# Patient Record
Sex: FEMALE | Race: WHITE | NOT HISPANIC OR LATINO | Employment: PART TIME | URBAN - NONMETROPOLITAN AREA
[De-identification: names, ages, dates, MRNs, and addresses within clinical notes are randomized per-mention and may not be internally consistent; named-entity substitution may affect disease eponyms.]

---

## 2020-09-21 ENCOUNTER — OFFICE VISIT (OUTPATIENT)
Dept: PULMONOLOGY | Facility: CLINIC | Age: 55
End: 2020-09-21

## 2020-09-21 VITALS
TEMPERATURE: 97.7 F | HEIGHT: 68 IN | BODY MASS INDEX: 28.04 KG/M2 | WEIGHT: 185 LBS | DIASTOLIC BLOOD PRESSURE: 76 MMHG | HEART RATE: 100 BPM | SYSTOLIC BLOOD PRESSURE: 118 MMHG | OXYGEN SATURATION: 77 %

## 2020-09-21 DIAGNOSIS — F17.210 CIGARETTE NICOTINE DEPENDENCE WITHOUT COMPLICATION: ICD-10-CM

## 2020-09-21 DIAGNOSIS — J44.9 COPD WITH HYPOXIA (HCC): Primary | ICD-10-CM

## 2020-09-21 DIAGNOSIS — R09.02 COPD WITH HYPOXIA (HCC): Primary | ICD-10-CM

## 2020-09-21 PROCEDURE — 94664 DEMO&/EVAL PT USE INHALER: CPT | Performed by: NURSE PRACTITIONER

## 2020-09-21 PROCEDURE — 99203 OFFICE O/P NEW LOW 30 MIN: CPT | Performed by: NURSE PRACTITIONER

## 2020-09-21 RX ORDER — ALBUTEROL SULFATE 90 UG/1
AEROSOL, METERED RESPIRATORY (INHALATION)
COMMUNITY
Start: 2020-07-09 | End: 2020-12-23

## 2020-09-21 RX ORDER — HYDROCODONE BITARTRATE AND ACETAMINOPHEN 10; 325 MG/1; MG/1
TABLET ORAL
COMMUNITY
Start: 2020-07-09

## 2020-09-21 RX ORDER — PREDNISONE 20 MG/1
40 TABLET ORAL DAILY
Qty: 10 TABLET | Refills: 0 | Status: SHIPPED | OUTPATIENT
Start: 2020-09-21 | End: 2021-12-15

## 2020-09-21 RX ORDER — IPRATROPIUM BROMIDE AND ALBUTEROL SULFATE 2.5; .5 MG/3ML; MG/3ML
3 SOLUTION RESPIRATORY (INHALATION) EVERY 12 HOURS SCHEDULED
COMMUNITY
End: 2020-12-23 | Stop reason: SDUPTHER

## 2020-09-21 NOTE — PROGRESS NOTES
Were you born premature?  no    Any Childhood infections? no      Breathing problems when you were a child? no    Any childhood allergies?    no             At what age did you begin smoking? 14 years old    Smoking marijuana? yes    Any IV drugs? no    How many packs per day? 1 1/2    Lung Function Test? no  Chest X-Ray? yes    CT Chest? yes Allergy Test? no    Family hx of Lung disease or Lung Cancer?yes    If FHx is posivitive for lung cancer, what is the relationship of the family member? sister(s)    Any hospitalization in the last year? no    How far can you walk without getting short of breath? Patient cant even walk around the house without being short of breath    Any coughing? yes    Any wheezing? yes    Acid Reflux? no    Do you snore? yes    Daytime Fatigue? yes    Any pets? yes (Dog and Cat)  Any pet allergies? 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 no    What inhalers are you currently using? Albuterol PRN      Subjective    Karissa Jay presents for the following Breathing Problem and Shortness of Breath  .    History of Present Illness     Ms. Jay is a 54 year old female with no significant medical history.     She presents today for further evaluation of shortness of breath.  She reports that she started to experience shortness of breath about 2 years ago.  She reports that is has gradually worsened.  She reports that she also experiences lightheadedness and dizziness with exertion.  She also reports feeling more fatigued over the last few months.  She does have a cough that is occasionally productive.  She is currently only using an albuterol inhaler and does use supplemental oxygen at 2 L/min during the night.  She currently smokes about 1-1/2 packs/day for the last 40 years.      Review of Systems   Constitutional: Positive for fatigue.   HENT: Positive for congestion.    Respiratory: Positive for cough, chest  "tightness, shortness of breath and wheezing.    Cardiovascular: Positive for leg swelling.   Allergic/Immunologic: Positive for environmental allergies.   All other systems reviewed and are negative.      Active Problems:  Problem List Items Addressed This Visit     None      Visit Diagnoses     COPD with hypoxia (CMS/[facility])    -  Primary    Relevant Medications    albuterol sulfate  (90 Base) MCG/ACT inhaler    ipratropium-albuterol (DUO-NEB) 0.5-2.5 mg/3 ml nebulizer    predniSONE (DELTASONE) 20 MG tablet    Cigarette nicotine dependence without complication              Past Medical History:  No past medical history on file.    Family History:  Family History   Problem Relation Age of Onset   • Diabetes Mother    • COPD Father    • Cancer Maternal Aunt    • Cancer Maternal Uncle    • Cancer Paternal Aunt    • Cancer Paternal Uncle        Social History:  Social History     Tobacco Use   • Smoking status: Heavy Tobacco Smoker     Packs/day: 1.50     Years: 41.00     Pack years: 61.50     Types: Cigarettes     Start date: 1/1/1979   • Smokeless tobacco: Never Used   • Tobacco comment: Patient doesnt feel like she can stop smoking on her own   Substance Use Topics   • Alcohol use: Never     Frequency: Never       Current Medications:  Current Outpatient Medications   Medication Sig Dispense Refill   • albuterol sulfate  (90 Base) MCG/ACT inhaler      • HYDROcodone-acetaminophen (NORCO)  MG per tablet      • ipratropium-albuterol (DUO-NEB) 0.5-2.5 mg/3 ml nebulizer 3 mL Every 12 (Twelve) Hours.     • predniSONE (DELTASONE) 20 MG tablet Take 2 tablets by mouth Daily. 10 tablet 0     No current facility-administered medications for this visit.        Allergies:  No Known Allergies    Vitals:  /76 (BP Location: Right arm, Patient Position: Sitting, Cuff Size: Adult)   Pulse 100   Temp 97.7 °F (36.5 °C) (Infrared)   Ht 172.7 cm (68\")   Wt 83.9 kg (185 lb)   SpO2 (!) 77%   BMI 28.13 kg/m² "     Imaging:    Imaging Results (Most Recent)     None          Pulmonary Functions Testing Results:    No results found for: FEV1, FVC, UZE2IXR, TLC, DLCO    No results found for this or any previous visit.    Objective   Physical Exam     GENERAL APPEARANCE: Well developed, well nourished, alert and cooperative, and appears to be in no acute distress.    HEAD: normocephalic. Atraumatic.    EYES: PERRL, EOMI. Vision is grossly intact.    THROAT: Oral cavity and pharynx normal. No inflammation, swelling, exudate, or lesions.     NECK: Neck supple.  No thyromegaly.    CARDIAC: Normal S1 and S2. No S3, S4 or murmurs. Rhythm is regular.     RESPIRATORY:Bilateral air entry positive. Bilateral diminished breath sounds. No wheezing, crackles or rhonchi noted.    GI: Positive bowel sounds. Soft, nondistended, nontender.     MUSCULOSKELETAL: No significant deformity or joint abnormality. No edema. Peripheral pulses intact. No varicosities.    NEUROLOGICAL: Strength and sensation symmetric and intact throughout.     PSYCHIATRIC: The mental examination revealed the patient was oriented to person, place, and time.       Assessment/Plan      COPD, unspecified:  -Ordered PFT to assess lung function  -Ordered chest xray  -Ordered Echo to evaluate LV function.  -Oxygen saturation noted to be 77% on room air at rest.  Will send order to the patient's [employer] company requesting a POC as well as other oxygen supplies.  Instructed her to wear her oxygen continuously.  -We will do overnight pulse oximetry study to evaluate oxygen saturation on her 2 L/min at nighttime.  -Will prescribe Prednisone 40 mg PO daily for 5 day for wheezing.  -Will start her on Stiolto, 2 puffs once daily.  Inhaler education was provided.     - Inhaler technique demonstration/discussion:  I have extensively discussed the steps.  In summary, the steps were discussed in the following order.Patient was advised to rinse the mouth after steroid inhalation to prevent  fungal mucositis.  · Remove the cap from the inhaler and shake well.    · Breathe out all the way.    · Place the mouthpiece of the inhaler between your teeth and seal your lips tightly around it.    · As you start to breathe in slowly, press down on the canister one time.   · Keep breathing in as slowly and deeply as you can.    · It should take about 5 seconds for you to completely breathe in.    · Hold your breath for 10 seconds(count to 10 slowly) to allow the medication to reach the airways of the lung.    · Repeat the above steps for each puff.    · Wait about 1 minute between the puffs.    · Replaced the cap on the inhaler when finished.    Patient's Body mass index is 28.13 kg/m². BMI is above normal parameters. Recommendations include: exercise counseling and nutrition counseling.    Current Smoker:    Karissa Jay  reports that she has been smoking cigarettes. She started smoking about 41 years ago. She has a 61.50 pack-year smoking history. She has never used smokeless tobacco.. I have educated her on the risk of diseases from using tobacco products such as cancer, COPD and heart diease.     I advised her to quit and she is not willing to quit.            ICD-10-CM ICD-9-CM   1. COPD with hypoxia (CMS/[facility])  J44.9 496    R09.02 799.02   2. Cigarette nicotine dependence without complication  F17.210 305.1       Return in about 3 months (around 12/21/2020).

## 2020-10-07 ENCOUNTER — TRANSCRIBE ORDERS (OUTPATIENT)
Dept: ADMINISTRATIVE | Facility: HOSPITAL | Age: 55
End: 2020-10-07

## 2020-10-07 DIAGNOSIS — Z11.59 ENCOUNTER FOR SCREENING FOR OTHER VIRAL DISEASES: Primary | ICD-10-CM

## 2020-10-09 ENCOUNTER — TELEPHONE (OUTPATIENT)
Dept: PULMONOLOGY | Facility: CLINIC | Age: 55
End: 2020-10-09

## 2020-10-09 ENCOUNTER — LAB (OUTPATIENT)
Dept: LAB | Facility: HOSPITAL | Age: 55
End: 2020-10-09

## 2020-10-09 DIAGNOSIS — Z11.59 ENCOUNTER FOR SCREENING FOR OTHER VIRAL DISEASES: ICD-10-CM

## 2020-10-09 PROCEDURE — U0004 COV-19 TEST NON-CDC HGH THRU: HCPCS

## 2020-10-09 PROCEDURE — C9803 HOPD COVID-19 SPEC COLLECT: HCPCS

## 2020-10-10 LAB — SARS-COV-2 RNA RESP QL NAA+PROBE: NOT DETECTED

## 2020-10-12 ENCOUNTER — HOSPITAL ENCOUNTER (OUTPATIENT)
Dept: GENERAL RADIOLOGY | Facility: HOSPITAL | Age: 55
Discharge: HOME | End: 2020-10-12

## 2020-10-12 ENCOUNTER — HOSPITAL ENCOUNTER (OUTPATIENT)
Dept: CARDIOLOGY | Facility: HOSPITAL | Age: 55
Discharge: HOME | End: 2020-10-12

## 2020-10-12 ENCOUNTER — HOSPITAL ENCOUNTER (OUTPATIENT)
Dept: RESPIRATORY THERAPY | Facility: HOSPITAL | Age: 55
Discharge: HOME | End: 2020-10-12

## 2020-10-12 DIAGNOSIS — R09.02 COPD WITH HYPOXIA (HCC): ICD-10-CM

## 2020-10-12 DIAGNOSIS — J44.9 COPD WITH HYPOXIA (HCC): ICD-10-CM

## 2020-10-12 LAB
BH CV ECHO MEAS - % IVS THICK: 11.6 %
BH CV ECHO MEAS - % LVPW THICK: 41.6 %
BH CV ECHO MEAS - ACS: 2.1 CM
BH CV ECHO MEAS - AO MAX PG: 6.1 MMHG
BH CV ECHO MEAS - AO MEAN PG: 3 MMHG
BH CV ECHO MEAS - AO ROOT AREA (BSA CORRECTED): 1.7
BH CV ECHO MEAS - AO ROOT AREA: 9.3 CM^2
BH CV ECHO MEAS - AO ROOT DIAM: 3.5 CM
BH CV ECHO MEAS - AO V2 MAX: 123 CM/SEC
BH CV ECHO MEAS - AO V2 MEAN: 81.1 CM/SEC
BH CV ECHO MEAS - AO V2 VTI: 26.4 CM
BH CV ECHO MEAS - BSA(HAYCOCK): 2 M^2
BH CV ECHO MEAS - BSA: 2 M^2
BH CV ECHO MEAS - BZI_BMI: 28.1 KILOGRAMS/M^2
BH CV ECHO MEAS - BZI_METRIC_HEIGHT: 172.7 CM
BH CV ECHO MEAS - BZI_METRIC_WEIGHT: 83.9 KG
BH CV ECHO MEAS - EDV(CUBED): 136.6 ML
BH CV ECHO MEAS - EDV(MOD-SP4): 52.2 ML
BH CV ECHO MEAS - EDV(TEICH): 126.6 ML
BH CV ECHO MEAS - EF(CUBED): 61.9 %
BH CV ECHO MEAS - EF(MOD-SP4): 57.9 %
BH CV ECHO MEAS - EF(TEICH): 53.1 %
BH CV ECHO MEAS - ESV(CUBED): 52.1 ML
BH CV ECHO MEAS - ESV(MOD-SP4): 22 ML
BH CV ECHO MEAS - ESV(TEICH): 59.5 ML
BH CV ECHO MEAS - FS: 27.5 %
BH CV ECHO MEAS - IVS/LVPW: 1.1
BH CV ECHO MEAS - IVSD: 1 CM
BH CV ECHO MEAS - IVSS: 1.1 CM
BH CV ECHO MEAS - LA DIMENSION: 2.7 CM
BH CV ECHO MEAS - LA/AO: 0.78
BH CV ECHO MEAS - LV DIASTOLIC VOL/BSA (35-75): 26.4 ML/M^2
BH CV ECHO MEAS - LV MASS(C)D: 183.7 GRAMS
BH CV ECHO MEAS - LV MASS(C)DI: 92.9 GRAMS/M^2
BH CV ECHO MEAS - LV MASS(C)S: 154.1 GRAMS
BH CV ECHO MEAS - LV MASS(C)SI: 77.9 GRAMS/M^2
BH CV ECHO MEAS - LV SYSTOLIC VOL/BSA (12-30): 11.1 ML/M^2
BH CV ECHO MEAS - LVIDD: 5.2 CM
BH CV ECHO MEAS - LVIDS: 3.7 CM
BH CV ECHO MEAS - LVLD AP4: 6.9 CM
BH CV ECHO MEAS - LVLS AP4: 6.3 CM
BH CV ECHO MEAS - LVOT AREA (M): 3.1 CM^2
BH CV ECHO MEAS - LVOT AREA: 3.1 CM^2
BH CV ECHO MEAS - LVOT DIAM: 2 CM
BH CV ECHO MEAS - LVPWD: 0.95 CM
BH CV ECHO MEAS - LVPWS: 1.3 CM
BH CV ECHO MEAS - MV A MAX VEL: 55.7 CM/SEC
BH CV ECHO MEAS - MV E MAX VEL: 62.4 CM/SEC
BH CV ECHO MEAS - MV E/A: 1.1
BH CV ECHO MEAS - PA ACC TIME: 0.15 SEC
BH CV ECHO MEAS - PA PR(ACCEL): 12.4 MMHG
BH CV ECHO MEAS - RAP SYSTOLE: 10 MMHG
BH CV ECHO MEAS - RVSP: 31.5 MMHG
BH CV ECHO MEAS - SI(AO): 124.8 ML/M^2
BH CV ECHO MEAS - SI(CUBED): 42.7 ML/M^2
BH CV ECHO MEAS - SI(MOD-SP4): 15.3 ML/M^2
BH CV ECHO MEAS - SI(TEICH): 34 ML/M^2
BH CV ECHO MEAS - SV(AO): 246.8 ML
BH CV ECHO MEAS - SV(CUBED): 84.5 ML
BH CV ECHO MEAS - SV(MOD-SP4): 30.2 ML
BH CV ECHO MEAS - SV(TEICH): 67.2 ML
BH CV ECHO MEAS - TR MAX VEL: 232 CM/SEC
LV EF 2D ECHO EST: 60 %
MAXIMAL PREDICTED HEART RATE: 168 BPM
STRESS TARGET HR: 143 BPM

## 2020-10-12 PROCEDURE — 93306 TTE W/DOPPLER COMPLETE: CPT | Performed by: INTERNAL MEDICINE

## 2020-10-12 PROCEDURE — 94640 AIRWAY INHALATION TREATMENT: CPT

## 2020-10-12 PROCEDURE — 71046 X-RAY EXAM CHEST 2 VIEWS: CPT

## 2020-10-12 PROCEDURE — 93356 MYOCRD STRAIN IMG SPCKL TRCK: CPT | Performed by: INTERNAL MEDICINE

## 2020-10-12 PROCEDURE — 94729 DIFFUSING CAPACITY: CPT

## 2020-10-12 PROCEDURE — 94060 EVALUATION OF WHEEZING: CPT

## 2020-10-12 PROCEDURE — 71046 X-RAY EXAM CHEST 2 VIEWS: CPT | Performed by: RADIOLOGY

## 2020-10-12 PROCEDURE — 93356 MYOCRD STRAIN IMG SPCKL TRCK: CPT

## 2020-10-12 PROCEDURE — 94727 GAS DIL/WSHOT DETER LNG VOL: CPT

## 2020-10-12 PROCEDURE — 94729 DIFFUSING CAPACITY: CPT | Performed by: INTERNAL MEDICINE

## 2020-10-12 PROCEDURE — 93306 TTE W/DOPPLER COMPLETE: CPT

## 2020-10-12 PROCEDURE — 94060 EVALUATION OF WHEEZING: CPT | Performed by: INTERNAL MEDICINE

## 2020-10-12 PROCEDURE — 94727 GAS DIL/WSHOT DETER LNG VOL: CPT | Performed by: INTERNAL MEDICINE

## 2020-10-12 RX ORDER — ALBUTEROL SULFATE 2.5 MG/3ML
2.5 SOLUTION RESPIRATORY (INHALATION) ONCE
Status: COMPLETED | OUTPATIENT
Start: 2020-10-12 | End: 2020-10-12

## 2020-10-12 RX ADMIN — ALBUTEROL SULFATE 2.5 MG: 2.5 SOLUTION RESPIRATORY (INHALATION) at 11:17

## 2020-10-19 DIAGNOSIS — R93.89 ABNORMAL CHEST X-RAY: Primary | ICD-10-CM

## 2020-10-19 NOTE — PROGRESS NOTES
Echo and Chest xray were discussed with the patient in detail.  Chest xray was reviewed by Dr. Francis, he recommends a CT chest for further evaluation.

## 2020-10-20 ENCOUNTER — DOCUMENTATION (OUTPATIENT)
Dept: PULMONOLOGY | Facility: CLINIC | Age: 55
End: 2020-10-20

## 2020-10-22 ENCOUNTER — TELEPHONE (OUTPATIENT)
Dept: PULMONOLOGY | Facility: CLINIC | Age: 55
End: 2020-10-22

## 2020-10-22 NOTE — TELEPHONE ENCOUNTER
----- Message from BIB Alcazar sent at 10/20/2020  4:13 PM EDT -----  Will you let her know that her PFT showed a severe COPD. She does have a good response to medication tho. We will continue her stiolto inhaler.  ----- Message -----  From: Antione Francis MD  Sent: 10/19/2020   6:05 PM EDT  To: BIB Alcazar

## 2020-10-28 ENCOUNTER — HOSPITAL ENCOUNTER (OUTPATIENT)
Dept: CT IMAGING | Facility: HOSPITAL | Age: 55
Discharge: HOME | End: 2020-10-28
Admitting: NURSE PRACTITIONER

## 2020-10-28 DIAGNOSIS — R93.89 ABNORMAL CHEST X-RAY: ICD-10-CM

## 2020-10-28 PROCEDURE — 71250 CT THORAX DX C-: CPT

## 2020-10-28 PROCEDURE — 71250 CT THORAX DX C-: CPT | Performed by: RADIOLOGY

## 2020-11-02 ENCOUNTER — DOCUMENTATION (OUTPATIENT)
Dept: PULMONOLOGY | Facility: CLINIC | Age: 55
End: 2020-11-02

## 2020-11-11 ENCOUNTER — TELEPHONE (OUTPATIENT)
Dept: PULMONOLOGY | Facility: CLINIC | Age: 55
End: 2020-11-11

## 2020-11-11 NOTE — TELEPHONE ENCOUNTER
----- Message from BIB Alcazar sent at 11/2/2020  3:08 PM EST -----  Can you let her know that her CT Chest shows emphysema, which is consistent with her PFT and we will continue her current inhalers.  ----- Message -----  From: Antione Francis MD  Sent: 10/29/2020  12:13 PM EST  To: BIB Alcazar    Lung is emphysematous not edematous.  PFT and inhalers are required.  Thank you  ----- Message -----  From: Amy Simmons APRN  Sent: 10/29/2020  12:01 PM EDT  To: Antione Francis MD    Recommendations?  ----- Message -----  From: Papito, Rad Results [place] [facility]  Sent: 10/29/2020   8:57 AM EDT  To: BIB Alcazar

## 2020-12-23 ENCOUNTER — OFFICE VISIT (OUTPATIENT)
Dept: PULMONOLOGY | Facility: CLINIC | Age: 55
End: 2020-12-23

## 2020-12-23 VITALS
SYSTOLIC BLOOD PRESSURE: 138 MMHG | BODY MASS INDEX: 28.67 KG/M2 | OXYGEN SATURATION: 88 % | WEIGHT: 189.2 LBS | TEMPERATURE: 97.1 F | HEART RATE: 86 BPM | DIASTOLIC BLOOD PRESSURE: 82 MMHG | HEIGHT: 68 IN

## 2020-12-23 DIAGNOSIS — J96.11 CHRONIC RESPIRATORY FAILURE WITH HYPOXIA (HCC): ICD-10-CM

## 2020-12-23 DIAGNOSIS — F17.210 CIGARETTE NICOTINE DEPENDENCE WITHOUT COMPLICATION: ICD-10-CM

## 2020-12-23 DIAGNOSIS — J41.0 SIMPLE CHRONIC BRONCHITIS (HCC): Primary | ICD-10-CM

## 2020-12-23 PROCEDURE — 99214 OFFICE O/P EST MOD 30 MIN: CPT | Performed by: NURSE PRACTITIONER

## 2020-12-23 RX ORDER — IPRATROPIUM BROMIDE AND ALBUTEROL SULFATE 2.5; .5 MG/3ML; MG/3ML
3 SOLUTION RESPIRATORY (INHALATION) EVERY 12 HOURS SCHEDULED
Qty: 360 ML | Refills: 6 | Status: SHIPPED | OUTPATIENT
Start: 2020-12-23

## 2020-12-23 RX ORDER — ALBUTEROL SULFATE 90 UG/1
2 AEROSOL, METERED RESPIRATORY (INHALATION) EVERY 4 HOURS PRN
Qty: 18 G | Refills: 11 | Status: SHIPPED | OUTPATIENT
Start: 2020-12-23

## 2020-12-23 RX ORDER — IBUPROFEN 800 MG/1
TABLET ORAL
COMMUNITY
Start: 2020-10-13

## 2021-12-15 ENCOUNTER — OFFICE VISIT (OUTPATIENT)
Dept: PULMONOLOGY | Facility: CLINIC | Age: 56
End: 2021-12-15

## 2021-12-15 VITALS
WEIGHT: 193 LBS | SYSTOLIC BLOOD PRESSURE: 128 MMHG | BODY MASS INDEX: 29.25 KG/M2 | HEART RATE: 89 BPM | OXYGEN SATURATION: 91 % | TEMPERATURE: 97.7 F | HEIGHT: 68 IN | DIASTOLIC BLOOD PRESSURE: 82 MMHG

## 2021-12-15 DIAGNOSIS — F17.213 CIGARETTE NICOTINE DEPENDENCE WITH WITHDRAWAL: ICD-10-CM

## 2021-12-15 DIAGNOSIS — J44.9 CHRONIC OBSTRUCTIVE PULMONARY DISEASE, UNSPECIFIED COPD TYPE (HCC): Primary | ICD-10-CM

## 2021-12-15 DIAGNOSIS — Z12.2 ENCOUNTER FOR SCREENING FOR LUNG CANCER: ICD-10-CM

## 2021-12-15 DIAGNOSIS — G47.34 NOCTURNAL HYPOXIA: ICD-10-CM

## 2021-12-15 PROCEDURE — 99214 OFFICE O/P EST MOD 30 MIN: CPT | Performed by: NURSE PRACTITIONER

## 2021-12-15 NOTE — PROGRESS NOTES
"Chief Complaint  COPD    Subjective          Karissa Jay presents to [facility] PULMONARY & CRITICAL CARE MEDICINE  History of Present Illness     Ms. Jay is a 56 year old female with a medical history significant for COPD.    She presents today for a routine follow up on COPD.  She states that she has been doing well since her last visit.  She is currently on Stiolto once daily and albuterol as needed.  Continue use of supplemental oxygen at night and as needed.      Objective   Vital Signs:   /82   Pulse 89   Temp 97.7 °F (36.5 °C) (Temporal)   Ht 172.7 cm (68\")   Wt 87.5 kg (193 lb)   SpO2 91%   BMI 29.35 kg/m²     Physical Exam     GENERAL APPEARANCE: Well developed, well nourished, alert and cooperative, and appears to be in no acute distress.    HEAD: normocephalic. Atraumatic.    EYES: PERRL, EOMI. Vision is grossly intact.    THROAT: Oral cavity and pharynx normal. No inflammation, swelling, exudate, or lesions.     NECK: Neck supple.  No thyromegaly.    CARDIAC: Normal S1 and S2. No S3, S4 or murmurs. Rhythm is regular.     RESPIRATORY:Bilateral air entry positive. Bilateral diminished breath sounds. No wheezing, crackles or rhonchi noted.    GI: Positive bowel sounds. Soft, nondistended, nontender.     MUSCULOSKELETAL: No significant deformity or joint abnormality. No edema. Peripheral pulses intact. No varicosities.    NEUROLOGICAL: Strength and sensation symmetric and intact throughout.     PSYCHIATRIC: The mental examination revealed the patient was oriented to person, place, and time.     Result Review :   The following data was reviewed by: BIB Alcazar on 12/15/2021:         Assessment and Plan    Diagnoses and all orders for this visit:    1. Chronic obstructive pulmonary disease, unspecified COPD type (HCC) (Primary)    2. Nocturnal hypoxia    3. Cigarette nicotine dependence with withdrawal  -     CT chest low dose wo; Future    4. Encounter for " screening for lung cancer  -     CT chest low dose wo; Future          Simple chronic bronchitis:  -Continue Stiolto once daily.  -continue albuterol and duo nebs every 4 hours as needed.      Current Smoker:    Currently smoking about 1.5 ppd.    Karissa Jay  reports that she has been smoking cigarettes. She started smoking about 42 years ago. She has been smoking about 1.50 packs per day. She has never used smokeless tobacco.. I have educated her on the risk of diseases from using tobacco products such as cancer, COPD and heart disease.     I advised her to quit and she is not willing to quit.      Will order low dose CT Chest for lung cancer screening.           Chronic hypoxic respiratory failure:  -Continue use of supplemental oxygen at night and as needed.    Patient's Body mass index is 29.35 kg/m². indicating that she is overweight (BMI 25-29.9). Obesity-related health conditions include the following: none. Obesity is unchanged. BMI is is above average; BMI management plan is completed. We discussed portion control and increasing exercise..        Follow Up   Return in about 6 months (around 6/15/2022).  Patient was given instructions and counseling regarding her condition or for health maintenance advice. Please see specific information pulled into the AVS if appropriate.

## 2022-01-21 ENCOUNTER — HOSPITAL ENCOUNTER (OUTPATIENT)
Dept: CT IMAGING | Facility: HOSPITAL | Age: 57
Discharge: HOME | End: 2022-01-21
Admitting: NURSE PRACTITIONER

## 2022-01-21 DIAGNOSIS — Z12.2 ENCOUNTER FOR SCREENING FOR LUNG CANCER: ICD-10-CM

## 2022-01-21 DIAGNOSIS — F17.213 CIGARETTE NICOTINE DEPENDENCE WITH WITHDRAWAL: ICD-10-CM

## 2022-01-21 PROCEDURE — 71271 CT THORAX LUNG CANCER SCR C-: CPT | Performed by: RADIOLOGY

## 2022-01-21 PROCEDURE — 71271 CT THORAX LUNG CANCER SCR C-: CPT

## 2022-01-28 ENCOUNTER — DOCUMENTATION (OUTPATIENT)
Dept: PULMONOLOGY | Facility: CLINIC | Age: 57
End: 2022-01-28

## 2022-01-28 NOTE — PROGRESS NOTES
Low dose CT chest showed no nodules. RADS category 1.  We will repeat in one year per screening protocol.

## 2022-01-31 ENCOUNTER — TELEPHONE (OUTPATIENT)
Dept: PULMONOLOGY | Facility: CLINIC | Age: 57
End: 2022-01-31

## 2022-06-16 ENCOUNTER — OFFICE VISIT (OUTPATIENT)
Dept: PULMONOLOGY | Facility: CLINIC | Age: 57
End: 2022-06-16

## 2022-06-16 VITALS
TEMPERATURE: 97.1 F | HEART RATE: 101 BPM | HEIGHT: 68 IN | SYSTOLIC BLOOD PRESSURE: 102 MMHG | OXYGEN SATURATION: 88 % | WEIGHT: 185.6 LBS | DIASTOLIC BLOOD PRESSURE: 72 MMHG | BODY MASS INDEX: 28.13 KG/M2

## 2022-06-16 DIAGNOSIS — F17.210 CIGARETTE NICOTINE DEPENDENCE WITHOUT COMPLICATION: ICD-10-CM

## 2022-06-16 DIAGNOSIS — J44.9 CHRONIC OBSTRUCTIVE PULMONARY DISEASE, UNSPECIFIED COPD TYPE: Primary | ICD-10-CM

## 2022-06-16 DIAGNOSIS — E66.3 OVERWEIGHT: ICD-10-CM

## 2022-06-16 PROCEDURE — 99214 OFFICE O/P EST MOD 30 MIN: CPT | Performed by: NURSE PRACTITIONER

## 2022-06-16 NOTE — PROGRESS NOTES
"Chief Complaint  COPD and Shortness of Breath    Subjective        Karissa Jay presents to [facility] PULMONARY & CRITICAL CARE MEDICINE  History of Present Illness     Ms. Jay is a 56 year old female with a medical history significant for COPD.     She presents today for a routine follow up on COPD and shortness of breath.  She reports that she does continue to have shortness of breath.  She is currently on stiolto once daily and albuterol every 4 hours as needed.  She is using supplemental oxygen at night and as needed.  Oxygen saturation was noted to be 88% after ambulation to room. She remains a smoker of 1.5 ppd.    Objective   Vital Signs:  /72 (BP Location: Left arm, Patient Position: Sitting)   Pulse 101   Temp 97.1 °F (36.2 °C)   Ht 172.7 cm (68\")   Wt 84.2 kg (185 lb 9.6 oz)   SpO2 (!) 88%   BMI 28.22 kg/m²   Estimated body mass index is 28.22 kg/m² as calculated from the following:    Height as of this encounter: 172.7 cm (68\").    Weight as of this encounter: 84.2 kg (185 lb 9.6 oz).    BMI is >= 25 and <30. (Overweight) The following options were offered after discussion;: exercise counseling/recommendations and nutrition counseling/recommendations      Physical Exam     GENERAL APPEARANCE: Well developed, well nourished, alert and cooperative, and appears to be in no acute distress.    HEAD: normocephalic. Atraumatic.    EYES: PERRL, EOMI. Vision is grossly intact.    THROAT: Oral cavity and pharynx normal. No inflammation, swelling, exudate, or lesions.     NECK: Neck supple.  No thyromegaly.    CARDIAC: Normal S1 and S2. No S3, S4 or murmurs. Rhythm is regular.     RESPIRATORY:Bilateral air entry positive. Bilateral diminished breath sounds. No wheezing, crackles or rhonchi noted.    GI: Positive bowel sounds. Soft, nondistended, nontender.     MUSCULOSKELETAL: No significant deformity or joint abnormality. No edema. Peripheral pulses intact. No " varicosities.    NEUROLOGICAL: Strength and sensation symmetric and intact throughout.     PSYCHIATRIC: The mental examination revealed the patient was oriented to person, place, and time.       Result Review :  The following data was reviewed by: BIB Alcazar on 06/16/2022:             Assessment and Plan   Diagnoses and all orders for this visit:    1. Chronic obstructive pulmonary disease, unspecified COPD type (HCC) (Primary)    2. Cigarette nicotine dependence without complication    3. Overweight    Other orders  -     Fluticasone-Umeclidin-Vilant (Trelegy Ellipta) 200-62.5-25 MCG/INH inhaler; Inhale 1 puff Daily.  Dispense: 60 each; Refill: 5            Simple chronic bronchitis:  Will discontinue stiolto one daily and start her on Trelegy once daily.  Continue albuterol every 4 hours as needed.      Discussed pulmonary rehab with her but she did not want to do this at this time due to gas prices.    Current Smoker:    Currently smoking about 1.5 ppd.    Karissa Jay  reports that she has been smoking cigarettes. She started smoking about 43 years ago. She has been smoking about 1.50 packs per day. She has never used smokeless tobacco.. I have educated her on the risk of diseases from using tobacco products such as cancer, COPD and heart disease.     I advised her to quit and she is not willing to quit.       LDCT was completed in 1/2022.  We will repeat in 1/2023.     Chronic hypoxic respiratory failure:  -Continue use of supplemental oxygen at night and as needed.  Advised her to maintain an oxygen saturation of 90%.        Follow Up   Return in about 6 months (around 12/16/2022).  Patient was given instructions and counseling regarding her condition or for health maintenance advice. Please see specific information pulled into the AVS if appropriate.

## 2022-12-21 ENCOUNTER — OFFICE VISIT (OUTPATIENT)
Dept: PULMONOLOGY | Facility: CLINIC | Age: 57
End: 2022-12-21

## 2022-12-21 VITALS
BODY MASS INDEX: 26.4 KG/M2 | SYSTOLIC BLOOD PRESSURE: 98 MMHG | DIASTOLIC BLOOD PRESSURE: 62 MMHG | OXYGEN SATURATION: 93 % | WEIGHT: 174.2 LBS | HEIGHT: 68 IN | HEART RATE: 76 BPM | TEMPERATURE: 97.8 F

## 2022-12-21 DIAGNOSIS — Z12.2 ENCOUNTER FOR SCREENING FOR LUNG CANCER: ICD-10-CM

## 2022-12-21 DIAGNOSIS — F17.210 CIGARETTE NICOTINE DEPENDENCE WITHOUT COMPLICATION: ICD-10-CM

## 2022-12-21 DIAGNOSIS — E66.3 OVERWEIGHT: ICD-10-CM

## 2022-12-21 DIAGNOSIS — G47.34 NOCTURNAL HYPOXIA: ICD-10-CM

## 2022-12-21 DIAGNOSIS — J41.0 SIMPLE CHRONIC BRONCHITIS: Primary | ICD-10-CM

## 2022-12-21 PROCEDURE — 99214 OFFICE O/P EST MOD 30 MIN: CPT | Performed by: NURSE PRACTITIONER

## 2022-12-21 RX ORDER — ALPRAZOLAM 0.25 MG/1
TABLET ORAL
COMMUNITY
Start: 2022-10-27

## 2022-12-21 NOTE — PROGRESS NOTES
"Chief Complaint  COPD    Subjective        Karissa Jay presents to [facility] PULMONARY & CRITICAL CARE MEDICINE  History of Present Illness     Ms. Jay is a 57 year old female with a medical history significant for COPD, an anxiety.    She presents today for follow up on COPD.  She states that her breathing has improved since she starting taking Trelegy.  She is also using albuterol as needed.  She is complaint with supplemental oxygen use at night and as needed.  She remains a current smoker of about 1.5 ppd.    Objective   Vital Signs:  BP 98/62 (BP Location: Left arm, Patient Position: Sitting)   Pulse 76   Temp 97.8 °F (36.6 °C)   Ht 172.7 cm (68\")   Wt 79 kg (174 lb 3.2 oz)   SpO2 93%   BMI 26.49 kg/m²   Estimated body mass index is 26.49 kg/m² as calculated from the following:    Height as of this encounter: 172.7 cm (68\").    Weight as of this encounter: 79 kg (174 lb 3.2 oz).    BMI is >= 25 and <30. (Overweight) The following options were offered after discussion;: exercise counseling/recommendations and nutrition counseling/recommendations      Physical Exam     GENERAL APPEARANCE: Well developed, well nourished, alert and cooperative, and appears to be in no acute distress.    HEAD: normocephalic. Atraumatic.    EYES: PERRL, EOMI. Vision is grossly intact.    THROAT: Oral cavity and pharynx normal. No inflammation, swelling, exudate, or lesions.     NECK: Neck supple.  No thyromegaly.    CARDIAC: Normal S1 and S2. No S3, S4 or murmurs. Rhythm is regular.     RESPIRATORY:Bilateral air entry positive. Bilateral diminished breath sounds. Bilateral wheezing noted.    GI: Positive bowel sounds. Soft, nondistended, nontender.     MUSCULOSKELETAL: No significant deformity or joint abnormality. No edema. Peripheral pulses intact. No varicosities.    NEUROLOGICAL: Strength and sensation symmetric and intact throughout.     PSYCHIATRIC: The mental examination revealed the patient was " oriented to person, place, and time.     Result Review :  The following data was reviewed by: BIB Alcazar on 12/21/2022:           Assessment and Plan   Diagnoses and all orders for this visit:    1. Simple chronic bronchitis (HCC) (Primary)    2. Cigarette nicotine dependence without complication  -     CT chest low dose wo; Future    3. Overweight    4. Nocturnal hypoxia    5. Encounter for screening for lung cancer  -     CT chest low dose wo; Future    Other orders  -     Fluticasone-Umeclidin-Vilant (Trelegy Ellipta) 200-62.5-25 MCG/ACT aerosol powder ; Inhale 1 puff Daily.  Dispense: 60 each; Refill: 5               Continue Trelegy once daily.  Refills sent to pharmacy.  Continue albuterol as needed.    Currently smoking about 1.5 ppd.    Karissa Jay  reports that she has been smoking cigarettes. She started smoking about 44 years ago. She has been smoking an average of 1.5 packs per day. She has never used smokeless tobacco.. I have educated her on the risk of diseases from using tobacco products such as cancer, COPD and heart disease.     I advised her to quit and she is not willing to quit.    Ordered LDCT to be completed in January 2023.       She is compliant with use of supplemental oxygen at night and as needed.    Follow Up   Return in about 6 months (around 6/21/2023).  Patient was given instructions and counseling regarding her condition or for health maintenance advice. Please see specific information pulled into the AVS if appropriate.

## 2023-01-27 ENCOUNTER — HOSPITAL ENCOUNTER (OUTPATIENT)
Dept: CT IMAGING | Facility: HOSPITAL | Age: 58
Discharge: HOME | End: 2023-01-27
Admitting: NURSE PRACTITIONER
Payer: COMMERCIAL

## 2023-01-27 DIAGNOSIS — Z12.2 ENCOUNTER FOR SCREENING FOR LUNG CANCER: ICD-10-CM

## 2023-01-27 DIAGNOSIS — F17.210 CIGARETTE NICOTINE DEPENDENCE WITHOUT COMPLICATION: ICD-10-CM

## 2023-01-27 PROCEDURE — 71271 CT THORAX LUNG CANCER SCR C-: CPT | Performed by: RADIOLOGY

## 2023-01-27 PROCEDURE — 71271 CT THORAX LUNG CANCER SCR C-: CPT

## 2023-02-02 ENCOUNTER — DOCUMENTATION (OUTPATIENT)
Dept: PULMONOLOGY | Facility: CLINIC | Age: 58
End: 2023-02-02
Payer: COMMERCIAL

## 2023-02-02 ENCOUNTER — TELEPHONE (OUTPATIENT)
Dept: PULMONOLOGY | Facility: CLINIC | Age: 58
End: 2023-02-02
Payer: COMMERCIAL

## 2023-11-13 RX ORDER — FLUTICASONE FUROATE, UMECLIDINIUM BROMIDE AND VILANTEROL TRIFENATATE 200; 62.5; 25 UG/1; UG/1; UG/1
1 POWDER RESPIRATORY (INHALATION) DAILY
Qty: 60 EACH | Refills: 5 | Status: SHIPPED | OUTPATIENT
Start: 2023-11-13

## 2024-01-10 ENCOUNTER — OFFICE VISIT (OUTPATIENT)
Dept: PULMONOLOGY | Facility: CLINIC | Age: 59
End: 2024-01-10
Payer: COMMERCIAL

## 2024-01-10 VITALS
BODY MASS INDEX: 25.76 KG/M2 | SYSTOLIC BLOOD PRESSURE: 114 MMHG | HEIGHT: 68 IN | WEIGHT: 170 LBS | HEART RATE: 49 BPM | TEMPERATURE: 96.8 F | DIASTOLIC BLOOD PRESSURE: 70 MMHG | OXYGEN SATURATION: 88 %

## 2024-01-10 DIAGNOSIS — Z12.2 ENCOUNTER FOR SCREENING FOR LUNG CANCER: ICD-10-CM

## 2024-01-10 DIAGNOSIS — F17.210 CIGARETTE NICOTINE DEPENDENCE WITHOUT COMPLICATION: ICD-10-CM

## 2024-01-10 DIAGNOSIS — E66.3 OVERWEIGHT: ICD-10-CM

## 2024-01-10 DIAGNOSIS — G47.34 NOCTURNAL HYPOXIA: ICD-10-CM

## 2024-01-10 DIAGNOSIS — J41.0 SIMPLE CHRONIC BRONCHITIS: Primary | ICD-10-CM

## 2024-01-10 NOTE — PROGRESS NOTES
"Chief Complaint  Simple chronic bronchitis (Trelegy was over $100 this month and would like to change or get a coupon. )    Subjective        Karissa Jay presents to [facility] PULMONARY & CRITICAL CARE MEDICINE  History of Present Illness    Ms. Jay is a 58 year old female with a medical history significant for COPD.      She presents today for follow up on COPD.  She states that she has been doing well since her last visit.  She reports that her breathing has been at baseline.  She is currently taking Trelegy but states that it was over $100 to  this month and is requesting a copay card.  She is also using albuterol as needed.  She is still smoking.        Objective   Vital Signs:  /70   Pulse (!) 49   Temp 96.8 °F (36 °C)   Ht 172.7 cm (68\")   Wt 77.1 kg (170 lb)   SpO2 (!) 88%   BMI 25.85 kg/m²   Estimated body mass index is 25.85 kg/m² as calculated from the following:    Height as of this encounter: 172.7 cm (68\").    Weight as of this encounter: 77.1 kg (170 lb).               Physical Exam     GENERAL APPEARANCE: Well developed, well nourished, alert and cooperative, and appears to be in no acute distress.    HEAD: normocephalic. Atraumatic.    EYES: PERRL, EOMI. Vision is grossly intact.    THROAT: Oral cavity and pharynx normal. No inflammation, swelling, exudate, or lesions.     NECK: Neck supple.  No thyromegaly.    CARDIAC: Normal S1 and S2. No S3, S4 or murmurs. Rhythm is regular.     RESPIRATORY:Bilateral air entry positive. Bilateral diminished breath sounds. No wheezing, crackles or rhonchi noted.    GI: Positive bowel sounds. Soft, nondistended, nontender.     MUSCULOSKELETAL: No significant deformity or joint abnormality. No edema. Peripheral pulses intact. No varicosities.    NEUROLOGICAL: Strength and sensation symmetric and intact throughout.     PSYCHIATRIC: The mental examination revealed the patient was oriented to person, place, and time.     Result " Review :  The following data was reviewed by: BIB Alcazar on 01/10/2024:                 Assessment and Plan   Diagnoses and all orders for this visit:    1. Simple chronic bronchitis (Primary)    2. Cigarette nicotine dependence without complication  -     CT chest low dose wo; Future    3. Nocturnal hypoxia    4. Overweight    5. Encounter for screening for lung cancer  -     CT chest low dose wo; Future          Provided her with a sample of Breztri and copay card due to the cost of Trelegy.  If she like the Breztri she will let me know and a prescription can be sent in.    Continue albuterol as needed.        Karissa Jay  reports that she has been smoking cigarettes. She started smoking about 45 years ago. She has been smoking an average of 1.5 packs per day. She has been exposed to tobacco smoke. She has never used smokeless tobacco.. I have educated her on the risk of diseases from using tobacco products such as cancer, COPD, and heart disease.     I advised her to quit and she is not interested at this time.       Ordered LDCT to be done for lung cancer screening.        Follow Up   Return in about 6 months (around 7/10/2024).  Patient was given instructions and counseling regarding her condition or for health maintenance advice. Please see specific information pulled into the AVS if appropriate.

## 2024-02-16 ENCOUNTER — HOSPITAL ENCOUNTER (OUTPATIENT)
Dept: CT IMAGING | Facility: HOSPITAL | Age: 59
Discharge: HOME | End: 2024-02-16
Admitting: NURSE PRACTITIONER
Payer: COMMERCIAL

## 2024-02-16 DIAGNOSIS — F17.210 CIGARETTE NICOTINE DEPENDENCE WITHOUT COMPLICATION: ICD-10-CM

## 2024-02-16 DIAGNOSIS — Z12.2 ENCOUNTER FOR SCREENING FOR LUNG CANCER: ICD-10-CM

## 2024-02-16 PROCEDURE — 71271 CT THORAX LUNG CANCER SCR C-: CPT

## 2024-02-16 PROCEDURE — 71271 CT THORAX LUNG CANCER SCR C-: CPT | Performed by: RADIOLOGY

## 2024-02-21 ENCOUNTER — DOCUMENTATION (OUTPATIENT)
Dept: PULMONOLOGY | Facility: CLINIC | Age: 59
End: 2024-02-21
Payer: COMMERCIAL

## 2024-02-21 NOTE — LETTER
February 21, 2024     Karissa Jay  [address]  [place] [address]        Dear Karissa:    This is to notify you of CT chest results.  A previous seen pulmonary nodule is noted to be the same size.  According to screening protocols we will plan to repeat a CT chest in one year.  Please give us a call if you have any questions.    Sincerely,        BIB Alcazar

## 2024-02-21 NOTE — PROGRESS NOTES
LDCT was reviewed. 4 mm pulmonary nodule is noted to be stable.  Lung ads category 2. We will repeat in one year.

## 2024-07-10 ENCOUNTER — OFFICE VISIT (OUTPATIENT)
Dept: PULMONOLOGY | Facility: CLINIC | Age: 59
End: 2024-07-10
Payer: COMMERCIAL

## 2024-07-10 VITALS
RESPIRATION RATE: 18 BRPM | BODY MASS INDEX: 28.85 KG/M2 | WEIGHT: 190.4 LBS | DIASTOLIC BLOOD PRESSURE: 79 MMHG | SYSTOLIC BLOOD PRESSURE: 138 MMHG | HEIGHT: 68 IN | OXYGEN SATURATION: 93 % | HEART RATE: 64 BPM

## 2024-07-10 DIAGNOSIS — J41.0 SIMPLE CHRONIC BRONCHITIS: Primary | ICD-10-CM

## 2024-07-10 DIAGNOSIS — F17.210 CIGARETTE NICOTINE DEPENDENCE WITHOUT COMPLICATION: ICD-10-CM

## 2024-07-10 DIAGNOSIS — E66.3 OVERWEIGHT: ICD-10-CM

## 2024-07-10 PROCEDURE — 99214 OFFICE O/P EST MOD 30 MIN: CPT | Performed by: NURSE PRACTITIONER

## 2024-07-10 NOTE — PROGRESS NOTES
"Chief Complaint  Simple chronic bronchitis    Subjective        Karissa Jay presents to [facility] PULMONARY & CRITICAL CARE MEDICINE  History of Present Illness    Ms. Jay is a 58 year old female with a medical history significant for COPD.    She presents today for follow up on COPD.  She reports that she is doing well.  She states that her breathing is at baseline.  She is currently using Trelegy once daily and albuterol as needed.  She remains a current smoker.    Objective   Vital Signs:  /79 (BP Location: Left arm, Patient Position: Sitting, Cuff Size: Adult)   Pulse 64   Resp 18   Ht 172.7 cm (68\")   Wt 86.4 kg (190 lb 6.4 oz)   SpO2 93%   BMI 28.95 kg/m²   Estimated body mass index is 28.95 kg/m² as calculated from the following:    Height as of this encounter: 172.7 cm (68\").    Weight as of this encounter: 86.4 kg (190 lb 6.4 oz).       BMI is >= 25 and <30. (Overweight) The following options were offered after discussion;: exercise counseling/recommendations and nutrition counseling/recommendations      Physical Exam     GENERAL APPEARANCE: Well developed, well nourished, alert and cooperative, and appears to be in no acute distress.    HEAD: normocephalic. Atraumatic.    EYES: PERRL, EOMI. Vision is grossly intact.    THROAT: Oral cavity and pharynx normal. No inflammation, swelling, exudate, or lesions.     NECK: Neck supple.  No thyromegaly.    CARDIAC: Normal S1 and S2. No S3, S4 or murmurs. Rhythm is regular.     RESPIRATORY:Bilateral air entry positive. Bilateral diminished breath sounds. No wheezing, crackles or rhonchi noted.    GI: Positive bowel sounds. Soft, nondistended, nontender.     MUSCULOSKELETAL: No significant deformity or joint abnormality. No edema. Peripheral pulses intact. No varicosities.    NEUROLOGICAL: Strength and sensation symmetric and intact throughout.     PSYCHIATRIC: The mental examination revealed the patient was oriented to person, " place, and time.     Result Review :    The following data was reviewed by: BIB Alcazar on 07/10/2024:                 Assessment and Plan     Diagnoses and all orders for this visit:    1. Simple chronic bronchitis (Primary)    2. Cigarette nicotine dependence without complication    3. Overweight          Continue use of Trelegy once daily.  Continue albuterol as needed.        Karissa Jay  reports that she has been smoking cigarettes. She started smoking about 45 years ago. She has a 68.3 pack-year smoking history. She has been exposed to tobacco smoke. She has never used smokeless tobacco. I have educated her on the risk of diseases from using tobacco products such as cancer, COPD, and heart disease.     I advised her to quit and she is not willing to quit.      Previous LDCT was completed in February 2024.  We will plan to repeat it one year.        We discussed diet and exercise.    Follow Up     Return in about 6 months (around 1/10/2025).  Patient was given instructions and counseling regarding her condition or for health maintenance advice. Please see specific information pulled into the AVS if appropriate.

## 2024-07-26 RX ORDER — FLUTICASONE FUROATE, UMECLIDINIUM BROMIDE AND VILANTEROL TRIFENATATE 200; 62.5; 25 UG/1; UG/1; UG/1
1 POWDER RESPIRATORY (INHALATION) DAILY
Qty: 60 EACH | Refills: 5 | Status: SHIPPED | OUTPATIENT
Start: 2024-07-26

## 2025-01-16 ENCOUNTER — OFFICE VISIT (OUTPATIENT)
Dept: PULMONOLOGY | Facility: CLINIC | Age: 60
End: 2025-01-16
Payer: COMMERCIAL

## 2025-01-16 VITALS
HEART RATE: 70 BPM | SYSTOLIC BLOOD PRESSURE: 98 MMHG | WEIGHT: 191.8 LBS | TEMPERATURE: 97.2 F | DIASTOLIC BLOOD PRESSURE: 62 MMHG | OXYGEN SATURATION: 88 % | HEIGHT: 68 IN | BODY MASS INDEX: 29.07 KG/M2

## 2025-01-16 DIAGNOSIS — Z12.2 ENCOUNTER FOR SCREENING FOR LUNG CANCER: ICD-10-CM

## 2025-01-16 DIAGNOSIS — G47.34 NOCTURNAL HYPOXIA: ICD-10-CM

## 2025-01-16 DIAGNOSIS — J41.0 SIMPLE CHRONIC BRONCHITIS: Primary | ICD-10-CM

## 2025-01-16 DIAGNOSIS — F17.210 CIGARETTE NICOTINE DEPENDENCE WITHOUT COMPLICATION: ICD-10-CM

## 2025-01-16 PROCEDURE — 99214 OFFICE O/P EST MOD 30 MIN: CPT | Performed by: NURSE PRACTITIONER

## 2025-01-16 RX ORDER — AZITHROMYCIN 250 MG/1
TABLET, FILM COATED ORAL
Qty: 6 TABLET | Refills: 0 | Status: SHIPPED | OUTPATIENT
Start: 2025-01-16

## 2025-01-16 RX ORDER — IPRATROPIUM BROMIDE AND ALBUTEROL SULFATE 2.5; .5 MG/3ML; MG/3ML
3 SOLUTION RESPIRATORY (INHALATION) EVERY 12 HOURS SCHEDULED
Qty: 360 ML | Refills: 6 | Status: SHIPPED | OUTPATIENT
Start: 2025-01-16

## 2025-01-16 RX ORDER — PREDNISONE 20 MG/1
40 TABLET ORAL DAILY
Qty: 10 TABLET | Refills: 0 | Status: SHIPPED | OUTPATIENT
Start: 2025-01-16

## 2025-01-16 RX ORDER — FLUTICASONE FUROATE, UMECLIDINIUM BROMIDE AND VILANTEROL TRIFENATATE 200; 62.5; 25 UG/1; UG/1; UG/1
1 POWDER RESPIRATORY (INHALATION) DAILY
Qty: 60 EACH | Refills: 5 | Status: SHIPPED | OUTPATIENT
Start: 2025-01-16

## 2025-01-16 NOTE — PROGRESS NOTES
"Chief Complaint  Simple chronic bronchitis    Subjective          Karissa Jay presents to [facility] PULMONARY & CRITICAL CARE MEDICINE for   History of Present Illness      Ms. Jay is a 59 year old female with a medical history significant for COPD.    She presents today for follow up on COPD.  She reports that she has been doing well since her last visit.  She states that her breathing has been at baseline.  She does report that for the last few days she has had increased congestion, cough, and shortness of breath. She also reports intermittent wheezing.  She reports that she is taking Terlegy once daily and albuterol as needed.  She states that she did have oxygen equipment at home but returned it because it was too expensive.  Oxygen saturation was noted to be 88% on room air after ambulation to exam room.   She is a current smoker.     Objective   Vital Signs:   BP 98/62   Pulse 70   Temp 97.2 °F (36.2 °C)   Ht 172.7 cm (68\")   Wt 87 kg (191 lb 12.8 oz)   SpO2 (!) 88%   BMI 29.16 kg/m²         Physical Exam    GENERAL APPEARANCE: Well developed, well nourished, alert and cooperative, and appears to be in no acute distress.    HEAD: normocephalic. Atraumatic.    EYES: PERRL, EOMI. Vision is grossly intact.    THROAT: Oral cavity and pharynx normal. No inflammation, swelling, exudate, or lesions.     NECK: Neck supple.  No thyromegaly.    CARDIAC: Normal S1 and S2. No S3, S4 or murmurs. Rhythm is regular.     RESPIRATORY:Bilateral air entry positive. No wheezing, crackles or rhonchi noted.    GI: Positive bowel sounds. Soft, nondistended, nontender.     MUSCULOSKELETAL: No significant deformity or joint abnormality. No edema. Peripheral pulses intact. No varicosities.    NEUROLOGICAL: Strength and sensation symmetric and intact throughout.     PSYCHIATRIC: The mental examination revealed the patient was oriented to person, place, and time.       Estimated body mass index is 29.16 kg/m² " "as calculated from the following:    Height as of this encounter: 172.7 cm (68\").    Weight as of this encounter: 87 kg (191 lb 12.8 oz).        Result Review :   The following data was reviewed by: BIB Alcazar on 2025:         PFT:10/12/2020    Spirometry showed very severe obstruction. Significant bronchodilator effect noted. Significant air trapping noted. Lung volumes are normal. Diffusing capacity, uncorrected for hemoglobin, is severely reduced.     Low dose lung cancer screenin24  FINDINGS:    Lungs and pleural spaces:  Stable emphysema.  Subpleural fibrosis  basal segments.  Emphysema stable.  4 mm nodule left lower lobe, image  #111, retrospectively stable.  No consolidation.  No pneumothorax.  No  significant effusion.    Heart:  Mild coronary artery calcifications, stable.    Bones/joints:  Unremarkable as visualized.  No acute fracture.  No  dislocation.    Soft tissues:  Unremarkable as visualized.    Vasculature:  See above.    Lymph nodes:  Scattered axillary lymph nodes are not enlarged.    Other findings:  Stable appearance of the upper abdomen.     IMPRESSION:    Stable exam.  Lung-RADS score: 2 - Benign Appearance or Behavior.   Recommend continued annual screening with a low-dose CT (LDCT) in 12  months.        This report was finalized on 2024 12:30 PM by Dr. Dominic Castellanos MD.      Previous chest imaging:NA    Alpha-1 antitrypsin screening:NA    STOP-Bang Score:   NA  [place] Sleepiness Scale:   NA      ABG:    pH No results found for: \"PHART\"   pO2 No results found for: \"PO2ART\"   pCO2 No results found for: \"NKY7BVF\"   HCO3 No results found for: \"DJW4CNW\"                      Assessment and Plan    Problem List Items Addressed This Visit    None  Visit Diagnoses       Simple chronic bronchitis    -  Primary    Relevant Medications    Fluticasone-Umeclidin-Vilant (Trelegy Ellipta) 200-62.5-25 MCG/ACT inhaler    ipratropium-albuterol (DUO-NEB) 0.5-2.5 mg/3 ml " nebulizer    Cigarette nicotine dependence without complication        Relevant Orders    CT chest low dose wo    Encounter for screening for lung cancer        Relevant Orders    CT chest low dose wo    Nocturnal hypoxia                Karissa Jay  reports that she has been smoking cigarettes. She started smoking about 46 years ago. She has a 69.1 pack-year smoking history. She has been exposed to tobacco smoke. She has never used smokeless tobacco. I have educated her on the risk of diseases from using tobacco products such as cancer, COPD, and heart disease.     I advised her to quit and she is not willing to quit.      Continue Trelegy once daily.  Continue albuterol as needed.    Oxygen saturation was noted to be 88% after ambulation to exam room.  She states that she has oxygen equipment but that she returned it due to cost.    Ordered low dose CT chest for lung cancer screening.         Follow Up   Return in about 6 months (around 7/16/2025).  Patient was given instructions and counseling regarding her condition or for health maintenance advice. Please see specific information pulled into the AVS if appropriate.

## 2025-02-21 ENCOUNTER — HOSPITAL ENCOUNTER (OUTPATIENT)
Facility: HOSPITAL | Age: 60
Discharge: HOME | End: 2025-02-21
Admitting: NURSE PRACTITIONER
Payer: COMMERCIAL

## 2025-02-21 DIAGNOSIS — Z12.2 ENCOUNTER FOR SCREENING FOR LUNG CANCER: ICD-10-CM

## 2025-02-21 DIAGNOSIS — F17.210 CIGARETTE NICOTINE DEPENDENCE WITHOUT COMPLICATION: ICD-10-CM

## 2025-02-21 PROCEDURE — 71271 CT THORAX LUNG CANCER SCR C-: CPT

## 2025-02-21 PROCEDURE — 71271 CT THORAX LUNG CANCER SCR C-: CPT | Performed by: RADIOLOGY

## 2025-03-05 ENCOUNTER — TELEPHONE (OUTPATIENT)
Dept: PULMONOLOGY | Facility: CLINIC | Age: 60
End: 2025-03-05
Payer: COMMERCIAL

## 2025-03-05 NOTE — TELEPHONE ENCOUNTER
----- Message from Marium Barba sent at 1/22/2019 12:41 PM CST -----  Contact: [phone]  Caller is requesting a sooner appointment. Caller declined first available appointment listed below. Caller will not accept being placed on the wait list and is requesting a message be sent to the provider.    When is the next available appointment:  April   Did you offer to schedule the next available appt and put the patient on the wait list?:yes      What visit type: ep  Symptoms:  Blood pressure check   Patient preference of timeframe to be scheduled:   What is the reason the patient is requesting a sooner appointment? (insurance terminating, changing jobs):    Would you prefer an answer via ePod Solart?: no   Comments:  Please advise, thanks   CT chest was reviewed.  She is noted to have a new nodule in the left upper lobe measuring 5 mm.  Groundglass nodules in the right upper and lower lobe is stable.  We will plan to get a CT Chest in 3 months.    Attempted to call patient. Left message for call back.

## 2025-03-11 DIAGNOSIS — R91.8 MULTIPLE PULMONARY NODULES: Primary | ICD-10-CM

## 2025-03-11 RX ORDER — VARENICLINE TARTRATE 0.5 (11)-1
0.5 KIT ORAL TAKE AS DIRECTED
Qty: 53 EACH | Refills: 0 | Status: SHIPPED | OUTPATIENT
Start: 2025-03-11

## 2025-03-11 RX ORDER — VARENICLINE TARTRATE 1 MG/1
1 TABLET, FILM COATED ORAL 2 TIMES DAILY
Qty: 60 TABLET | Refills: 1 | Status: SHIPPED | OUTPATIENT
Start: 2025-03-11

## 2025-03-11 NOTE — PROGRESS NOTES
Notified the patient of CT Chest results.  She is noted to have a nodule in the left lung and 2 in the right with the largest measuring 6 mm.  We will plan to repeat CT chest in 3 months.    She also wanted to discuss smoking cessation.  Sent in Chantix to aid in smoking cessation.

## 2025-05-23 ENCOUNTER — HOSPITAL ENCOUNTER (OUTPATIENT)
Facility: HOSPITAL | Age: 60
Discharge: HOME | End: 2025-05-23
Admitting: NURSE PRACTITIONER
Payer: COMMERCIAL

## 2025-05-23 DIAGNOSIS — R91.8 MULTIPLE PULMONARY NODULES: ICD-10-CM

## 2025-05-23 PROCEDURE — 71250 CT THORAX DX C-: CPT | Performed by: RADIOLOGY

## 2025-05-23 PROCEDURE — 71250 CT THORAX DX C-: CPT

## 2025-05-29 ENCOUNTER — DOCUMENTATION (OUTPATIENT)
Dept: PULMONOLOGY | Facility: CLINIC | Age: 60
End: 2025-05-29
Payer: COMMERCIAL

## 2025-05-29 NOTE — LETTER
Karissa Jay  [address]  [facility] [address]    May 29, 2025     Dear Ms. Jay:    Below are the results from your recent visit:    CT Chest was reviewed. Previously noted pulmonary nodules are noted to be stable.  We will plan to repeat CT chest in 6 months.    If you have any questions or concerns, please don't hesitate to call.         Sincerely,        BIB Alcazar

## 2025-05-30 NOTE — PROGRESS NOTES
CT chest was reviewed.  Pulmonary nodules are noted to be stable.  We will plan to repeat CT Chest in 6 months.

## 2025-07-17 ENCOUNTER — OFFICE VISIT (OUTPATIENT)
Dept: PULMONOLOGY | Facility: CLINIC | Age: 60
End: 2025-07-17
Payer: COMMERCIAL

## 2025-07-17 VITALS
TEMPERATURE: 97.7 F | SYSTOLIC BLOOD PRESSURE: 120 MMHG | WEIGHT: 185.4 LBS | HEART RATE: 78 BPM | BODY MASS INDEX: 28.1 KG/M2 | DIASTOLIC BLOOD PRESSURE: 76 MMHG | HEIGHT: 68 IN | OXYGEN SATURATION: 86 %

## 2025-07-17 DIAGNOSIS — J41.0 SIMPLE CHRONIC BRONCHITIS: Primary | ICD-10-CM

## 2025-07-17 DIAGNOSIS — G47.34 NOCTURNAL HYPOXIA: ICD-10-CM

## 2025-07-17 DIAGNOSIS — R91.8 MULTIPLE PULMONARY NODULES: ICD-10-CM

## 2025-07-17 DIAGNOSIS — F17.210 CIGARETTE NICOTINE DEPENDENCE WITHOUT COMPLICATION: ICD-10-CM

## 2025-07-17 PROCEDURE — 99214 OFFICE O/P EST MOD 30 MIN: CPT | Performed by: NURSE PRACTITIONER

## 2025-07-17 RX ORDER — ALBUTEROL SULFATE 90 UG/1
2 INHALANT RESPIRATORY (INHALATION) EVERY 4 HOURS PRN
Qty: 18 G | Refills: 11 | Status: SHIPPED | OUTPATIENT
Start: 2025-07-17

## 2025-07-17 RX ORDER — IPRATROPIUM BROMIDE AND ALBUTEROL SULFATE 2.5; .5 MG/3ML; MG/3ML
3 SOLUTION RESPIRATORY (INHALATION) EVERY 12 HOURS SCHEDULED
Qty: 360 ML | Refills: 6 | Status: SHIPPED | OUTPATIENT
Start: 2025-07-17

## 2025-07-17 RX ORDER — FLUTICASONE FUROATE, UMECLIDINIUM BROMIDE AND VILANTEROL TRIFENATATE 200; 62.5; 25 UG/1; UG/1; UG/1
1 POWDER RESPIRATORY (INHALATION) DAILY
Qty: 60 EACH | Refills: 5 | Status: SHIPPED | OUTPATIENT
Start: 2025-07-17

## 2025-07-17 NOTE — PROGRESS NOTES
"Chief Complaint  Simple chronic bronchitis    Subjective          Karissa Jay presents to [facility] PULMONARY & CRITICAL CARE MEDICINE for   History of Present Illness    Ms. Jay is a 59-year-old female with a medical history significant for COPD.    She presents today for follow-up on chronic bronchitis.  She reports that overall she has been doing well since her last visit.  She does have good days and bad days with her breathing.  She is currently using Trelegy once daily and albuterol as needed.  She tells me that she has been in the hospital due to exacerbations since her last visit.  She states that she was discharged home from the hospital with supplemental oxygen but that she is having difficulty pulling around the large portable tanks.  She would like a POC.  She remains a current smoker.    Objective   Vital Signs:   /76   Pulse 78   Temp 97.7 °F (36.5 °C)   Ht 172.7 cm (67.99\")   Wt 84.1 kg (185 lb 6.4 oz)   SpO2 (!) 86%   BMI 28.20 kg/m²         Physical Exam    GENERAL APPEARANCE: Well developed, well nourished, alert and cooperative, and appears to be in no acute distress.    HEAD: normocephalic. Atraumatic.    EYES: PERRL, EOMI. Vision is grossly intact.    THROAT: Oral cavity and pharynx normal. No inflammation, swelling, exudate, or lesions.     NECK: Neck supple.  No thyromegaly.    CARDIAC: Normal S1 and S2. No S3, S4 or murmurs. Rhythm is regular.     RESPIRATORY:Bilateral air entry positive.  No wheezing, crackles or rhonchi noted.    GI: Positive bowel sounds. Soft, nondistended, nontender.     MUSCULOSKELETAL: No significant deformity or joint abnormality. No edema. Peripheral pulses intact. No varicosities.    NEUROLOGICAL: Strength and sensation symmetric and intact throughout.     PSYCHIATRIC: The mental examination revealed the patient was oriented to person, place, and time.       Estimated body mass index is 28.2 kg/m² as calculated from the " "following:    Height as of this encounter: 172.7 cm (67.99\").    Weight as of this encounter: 84.1 kg (185 lb 6.4 oz).        Result Review :   The following data was reviewed by: BIB Alcazar on 2025:       PFT:10/12/2020     Spirometry showed very severe obstruction. Significant bronchodilator effect noted. Significant air trapping noted. Lung volumes are normal. Diffusing capacity, uncorrected for hemoglobin, is severely reduced.      Low dose lung cancer screenin2025    FINDINGS:     LUNGS: 6 mm nodule left upper lobe image 25 series 2.  Small 5 mm groundglass nodule right upper lobe image 74 series 2.  Groundglass nodule right lower lobe image 94 series 2.  COPD.     HEART: Minimal coronary artery calcifications.     MEDIASTINUM: No masses. No enlarged lymph nodes.  No fluid collections.     PLEURA: No pleural effusion. No pleural mass or abnormal calcification.  No pneumothorax.     VASCULATURE: No evidence of aneurysm.     BONES: No acute bony abnormality.     VISUALIZED UPPER ABDOMEN:The upper abdomen is unremarkable as  visualized.     Other: None.     IMPRESSION:     1. Nodule in the left upper lobe which appears to be new.  2.  Lung-RADS score: 4A - Suspicious.  Recommend low-dose CT (LDCT) in 3  months or PET/CT for solid components 8 mm or larger in size.                    This report was finalized on 2025 1:19 PM by Dr. Bry Jacobs MD.        Previous chest imaging:  CT chest 2025  FINDINGS:    Lungs and pleural spaces:  Stable 4.5 mm nodule left upper lobe, image  #18.  Stable 3.6 mm nodule right upper lobe, image #25.  Stable 5.0 mm  heterogeneous nodule left lower lobe, image #39.  6.3 mm heterogeneous  nodule right lower lobe, image #66, stable from previous.  5.1 mm nodule  left lower lobe, image #71, stable from previous.  Other scattered  smaller sub-5 mm nodules both lungs are stable.  Centrilobular emphysema  is stable.  No consolidation.  No " "significant effusion.  No  pneumothorax.    Heart:  Trace pericardial effusion, stable.  Coronary artery  calcifications, stable.  No cardiomegaly.    Mediastinum:  Unremarkable.  No hilar or mediastinal lymphadenopathy.    Bones/joints:  Degenerative changes thoracic spine.  No acute  fracture.    Soft tissues:  Unremarkable.    Vasculature:  See above.    Lymph nodes:  See above.     IMPRESSION:  1.  Stable bilateral pulmonary nodules. No new nodules have developed.  2.  Otherwise stable chest CT with other incidental/nonacute findings as  above.     This report was finalized on 5/23/2025 11:46 AM by Dr. Dominic Castellanos MD.       Alpha-1 antitrypsin screening:NA     STOP-Bang Score:   NA  [place] Sleepiness Scale:   NA        ABG:    pH No results found for: \"PHART\"   pO2 No results found for: \"PO2ART\"   pCO2 No results found for: \"LWH0IQR\"   HCO3 No results found for: \"IUP3IHT\"                      Assessment and Plan    Problem List Items Addressed This Visit    None  Visit Diagnoses         Simple chronic bronchitis    -  Primary    Relevant Medications    Fluticasone-Umeclidin-Vilant (Trelegy Ellipta) 200-62.5-25 MCG/ACT inhaler    albuterol sulfate  (90 Base) MCG/ACT inhaler    ipratropium-albuterol (DUO-NEB) 0.5-2.5 mg/3 ml nebulizer    Other Relevant Orders    Oxygen Therapy      Multiple pulmonary nodules        Relevant Medications    Fluticasone-Umeclidin-Vilant (Trelegy Ellipta) 200-62.5-25 MCG/ACT inhaler    albuterol sulfate  (90 Base) MCG/ACT inhaler    ipratropium-albuterol (DUO-NEB) 0.5-2.5 mg/3 ml nebulizer    Other Relevant Orders    CT Chest Without Contrast      Cigarette nicotine dependence without complication          Nocturnal hypoxia                Karissa Jay  reports that she has been smoking cigarettes. She started smoking about 46 years ago. She has a 69.8 pack-year smoking history. She has been exposed to tobacco smoke. She has never used smokeless tobacco. I have " educated her on the risk of diseases from using tobacco products such as cancer, COPD, and heart disease.     I advised her to quit and she is interested in quitting but has not yet set a quit date.    Discussed CT chest in detail.  Pulmonary nodule was noted to be stable at this time.  We will plan to repeat CT chest in 6 months.    Continue Trelegy once daily.  Continue albuterol every 4 hours as needed.  Send in refills for neb treatments to use as needed.    She has noted to be 86% on room air at rest today.  She reports that she would like to have a POC as this would be easier for her to carry with her to her appointments.           Follow Up   Return in about 6 months (around 1/17/2026).  Patient was given instructions and counseling regarding her condition or for health maintenance advice. Please see specific information pulled into the AVS if appropriate.